# Patient Record
Sex: MALE | Race: WHITE | Employment: OTHER | URBAN - NONMETROPOLITAN AREA
[De-identification: names, ages, dates, MRNs, and addresses within clinical notes are randomized per-mention and may not be internally consistent; named-entity substitution may affect disease eponyms.]

---

## 2017-05-16 ENCOUNTER — OFFICE VISIT (OUTPATIENT)
Age: 62
End: 2017-05-16

## 2017-05-16 VITALS
OXYGEN SATURATION: 100 % | RESPIRATION RATE: 18 BRPM | HEIGHT: 72 IN | WEIGHT: 223.6 LBS | HEART RATE: 60 BPM | TEMPERATURE: 97.1 F | DIASTOLIC BLOOD PRESSURE: 70 MMHG | SYSTOLIC BLOOD PRESSURE: 120 MMHG | BODY MASS INDEX: 30.28 KG/M2

## 2017-05-16 DIAGNOSIS — R19.02 ABDOMINAL MASS, LUQ (LEFT UPPER QUADRANT): Primary | ICD-10-CM

## 2017-05-16 PROCEDURE — 3017F COLORECTAL CA SCREEN DOC REV: CPT | Performed by: SURGERY

## 2017-05-16 PROCEDURE — 99203 OFFICE O/P NEW LOW 30 MIN: CPT | Performed by: SURGERY

## 2017-05-16 PROCEDURE — G8427 DOCREV CUR MEDS BY ELIG CLIN: HCPCS | Performed by: SURGERY

## 2017-05-16 PROCEDURE — 1036F TOBACCO NON-USER: CPT | Performed by: SURGERY

## 2017-05-16 PROCEDURE — G8417 CALC BMI ABV UP PARAM F/U: HCPCS | Performed by: SURGERY

## 2017-05-16 ASSESSMENT — ENCOUNTER SYMPTOMS
STRIDOR: 0
CONSTIPATION: 0
ABDOMINAL DISTENTION: 0
APNEA: 0
NAUSEA: 0
BACK PAIN: 0
WHEEZING: 0
COLOR CHANGE: 0
SHORTNESS OF BREATH: 0
SINUS PRESSURE: 0
ABDOMINAL PAIN: 0
RECTAL PAIN: 0
EYE PAIN: 0
PHOTOPHOBIA: 0
TROUBLE SWALLOWING: 0
CHOKING: 0
DIARRHEA: 0
EYE ITCHING: 0
EYE DISCHARGE: 0
RHINORRHEA: 0
ANAL BLEEDING: 0
VOMITING: 0
FACIAL SWELLING: 0
BLOOD IN STOOL: 0
CHEST TIGHTNESS: 0
EYE REDNESS: 0
SORE THROAT: 0
VOICE CHANGE: 0
COUGH: 0

## 2017-05-30 ENCOUNTER — OFFICE VISIT (OUTPATIENT)
Age: 62
End: 2017-05-30

## 2017-05-30 VITALS
WEIGHT: 223.4 LBS | BODY MASS INDEX: 30.26 KG/M2 | HEIGHT: 72 IN | HEART RATE: 65 BPM | OXYGEN SATURATION: 96 % | TEMPERATURE: 97.4 F | RESPIRATION RATE: 16 BRPM | SYSTOLIC BLOOD PRESSURE: 118 MMHG | DIASTOLIC BLOOD PRESSURE: 74 MMHG

## 2017-05-30 DIAGNOSIS — R19.02 ABDOMINAL MASS, LUQ (LEFT UPPER QUADRANT): Primary | ICD-10-CM

## 2017-05-30 PROCEDURE — 99212 OFFICE O/P EST SF 10 MIN: CPT | Performed by: SURGERY

## 2017-05-30 PROCEDURE — G8417 CALC BMI ABV UP PARAM F/U: HCPCS | Performed by: SURGERY

## 2017-05-30 PROCEDURE — G8427 DOCREV CUR MEDS BY ELIG CLIN: HCPCS | Performed by: SURGERY

## 2017-05-30 PROCEDURE — 1036F TOBACCO NON-USER: CPT | Performed by: SURGERY

## 2017-05-30 PROCEDURE — 3017F COLORECTAL CA SCREEN DOC REV: CPT | Performed by: SURGERY

## 2018-09-29 ENCOUNTER — NURSE TRIAGE (OUTPATIENT)
Dept: ADMINISTRATIVE | Age: 63
End: 2018-09-29

## 2018-09-29 NOTE — TELEPHONE ENCOUNTER
Reason for Disposition   Triager unable to answer question    Answer Assessment - Initial Assessment Questions  1. ONSET: \"When did it happen? \" If happened < 10 minutes ago, ask: \"Did you wash off the chemical?\" If not, give First Aid Advice immediately. yesterday  2. CHEMICAL: \"What is the name of the substance? \"      Chlorine bleach   3. LOCATION: \"Where is the burn located? \"       Rt hand   4. BURN SIZE: \"How large is the burn? \"  The palm is roughly 1% of the total body surface area (BSA). Most of top and some on palm   5. SEVERITY OF THE BURN: \"Is there redness? \", \"Are there any blisters? \"      Redness  fluid  6. MECHANISM: \"Tell me how it happened. \"      Was cleaning   7. PAIN: Amelia Seats you having any pain? \" \"How bad is the pain? \" (Scale 1-10; or mild, moderate, severe)    - MILD (1-3): doesn't interfere with normal activities     - MODERATE (4-7): interferes with normal activities or awakens from sleep     - SEVERE (8-10): excruciating pain, unable to do any normal activities       irritated  8. OTHER SYMPTOMS: \"Do you have any other symptoms? \"      no  9. PREGNANCY: \"Is there any chance you are pregnant? \" \"When was your last menstrual period? \"      n/a    Protocols used: BURNS - CHEMICAL-ADULT-

## 2022-04-13 VITALS
HEART RATE: 55 BPM | WEIGHT: 229 LBS | BODY MASS INDEX: 31.06 KG/M2 | SYSTOLIC BLOOD PRESSURE: 137 MMHG | DIASTOLIC BLOOD PRESSURE: 67 MMHG

## 2022-04-13 PROBLEM — E78.5 HYPERLIPIDEMIA: Status: ACTIVE | Noted: 2022-04-13

## 2022-04-13 PROBLEM — G89.18 PAIN FOLLOWING SURGERY OR PROCEDURE: Status: ACTIVE | Noted: 2017-01-25

## 2022-04-13 PROBLEM — I10 BENIGN ESSENTIAL HTN: Status: ACTIVE | Noted: 2022-04-13

## 2022-04-13 PROBLEM — Z95.5 PRESENCE OF STENT IN CORONARY ARTERY: Status: ACTIVE | Noted: 2022-04-13

## 2022-04-13 PROBLEM — R91.1 LESION OF LUNG: Status: ACTIVE | Noted: 2017-01-25

## 2022-10-04 ENCOUNTER — OFFICE VISIT (OUTPATIENT)
Dept: CARDIOLOGY CLINIC | Age: 67
End: 2022-10-04
Payer: MEDICARE

## 2022-10-04 VITALS
SYSTOLIC BLOOD PRESSURE: 154 MMHG | BODY MASS INDEX: 31.89 KG/M2 | WEIGHT: 235.4 LBS | HEIGHT: 72 IN | HEART RATE: 59 BPM | DIASTOLIC BLOOD PRESSURE: 81 MMHG | RESPIRATION RATE: 18 BRPM

## 2022-10-04 DIAGNOSIS — I10 BENIGN ESSENTIAL HTN: Primary | ICD-10-CM

## 2022-10-04 DIAGNOSIS — I71.21 ANEURYSM OF ASCENDING AORTA WITHOUT RUPTURE: ICD-10-CM

## 2022-10-04 DIAGNOSIS — E78.5 DYSLIPIDEMIA: ICD-10-CM

## 2022-10-04 PROCEDURE — 93000 ELECTROCARDIOGRAM COMPLETE: CPT | Performed by: INTERNAL MEDICINE

## 2022-10-04 PROCEDURE — 1123F ACP DISCUSS/DSCN MKR DOCD: CPT | Performed by: INTERNAL MEDICINE

## 2022-10-04 PROCEDURE — 99213 OFFICE O/P EST LOW 20 MIN: CPT | Performed by: INTERNAL MEDICINE

## 2022-10-04 RX ORDER — ATORVASTATIN CALCIUM 40 MG/1
40 TABLET, FILM COATED ORAL DAILY
COMMUNITY
End: 2022-10-04 | Stop reason: SDUPTHER

## 2022-10-04 RX ORDER — NITROGLYCERIN 0.4 MG/1
0.4 TABLET SUBLINGUAL EVERY 5 MIN PRN
Qty: 25 TABLET | Refills: 6 | Status: SHIPPED | OUTPATIENT
Start: 2022-10-04

## 2022-10-04 RX ORDER — LOSARTAN POTASSIUM 25 MG/1
25 TABLET ORAL EVERY MORNING
Qty: 90 TABLET | Refills: 3 | Status: CANCELLED | OUTPATIENT
Start: 2022-10-04

## 2022-10-04 RX ORDER — LOSARTAN POTASSIUM 100 MG/1
100 TABLET ORAL DAILY
Qty: 30 TABLET | Refills: 5 | Status: SHIPPED | OUTPATIENT
Start: 2022-10-04

## 2022-10-04 RX ORDER — ATORVASTATIN CALCIUM 40 MG/1
40 TABLET, FILM COATED ORAL DAILY
Qty: 90 TABLET | Refills: 3 | Status: SHIPPED | OUTPATIENT
Start: 2022-10-04

## 2022-10-04 ASSESSMENT — ENCOUNTER SYMPTOMS
STRIDOR: 0
ABDOMINAL PAIN: 0
SHORTNESS OF BREATH: 0
VOMITING: 0
COLOR CHANGE: 0
ABDOMINAL DISTENTION: 0
TROUBLE SWALLOWING: 0
NAUSEA: 0
CHEST TIGHTNESS: 0
COUGH: 0
CHOKING: 0
ANAL BLEEDING: 0
VOICE CHANGE: 0
WHEEZING: 0
APNEA: 0
BLOOD IN STOOL: 0

## 2022-10-04 NOTE — PROGRESS NOTES
[address] [facility]  [address] [facility],[address]  Dept: [phone]  Loc: [phone]     10/4/2022       Sherri Graves is here today for   Chief Complaint   Patient presents with    Follow-up           Referring Physician:  No ref. provider found     Patient Active Problem List   Diagnosis    ST elevation myocardial infarction (STEMI) of inferior wall (HCC)    Presence of stent in coronary artery    Pain following surgery or procedure    Lesion of lung    Hyperlipidemia    Benign essential HTN       Review of Systems   Constitutional:  Negative for activity change, appetite change, fatigue, fever and unexpected weight change. HENT:  Negative for congestion, trouble swallowing and voice change. Eyes:  Negative for visual disturbance. Respiratory:  Negative for apnea, cough, choking, chest tightness, shortness of breath, wheezing and stridor. Cardiovascular:  Negative for chest pain, palpitations and leg swelling. Gastrointestinal:  Negative for abdominal distention, abdominal pain, anal bleeding, blood in stool, nausea and vomiting. Endocrine: Negative for cold intolerance and heat intolerance. Genitourinary:  Negative for hematuria. Musculoskeletal:  Negative for arthralgias, gait problem, joint swelling and myalgias. Skin:  Negative for color change and rash. Allergic/Immunologic: Negative for environmental allergies and food allergies. Neurological:  Negative for dizziness, tremors, syncope, facial asymmetry, weakness, light-headedness, numbness and headaches. Hematological:  Does not bruise/bleed easily. Psychiatric/Behavioral:  Negative for agitation, behavioral problems and sleep disturbance.        Past Medical History:   Diagnosis Date    CAD (coronary artery disease)     Hyperlipidemia     Hypertension        No Known Allergies    Current Outpatient Medications   Medication Sig Dispense Refill atorvastatin (LIPITOR) 40 MG tablet Take 1 tablet by mouth daily 90 tablet 3    metoprolol tartrate (LOPRESSOR) 25 MG tablet Take 1 tablet by mouth every evening 1/2 tablet twice daily 45 tablet 3    nitroGLYCERIN (NITROSTAT) 0.4 MG SL tablet Place 1 tablet under the tongue every 5 minutes as needed for Chest pain 25 tablet 6    losartan (COZAAR) 100 MG tablet Take 1 tablet by mouth daily 30 tablet 5    aspirin 325 MG tablet Take 1 tablet by mouth daily. 90 tablet 3    FISH OIL 1 capsule by Does not apply route daily. ALLOPURINOL PO Take 300 tablets by mouth daily. Indications: Gout      Coenzyme Q10 (COQ10 PO) Take 1 capsule by mouth daily. Indications: Decreased Mental Sharpness and Accuracy       No current facility-administered medications for this visit. Social History     Socioeconomic History    Marital status:      Spouse name: None    Number of children: None    Years of education: None    Highest education level: None   Tobacco Use    Smoking status: Never    Smokeless tobacco: Never   Substance and Sexual Activity    Alcohol use: No    Drug use: No    Sexual activity: Never       Family History   Problem Relation Age of Onset    Arthritis Mother     Kidney Disease Mother     Cancer Father     Hearing Loss Father     High Cholesterol Father     Vision Loss Father     Hearing Loss Sister     High Cholesterol Sister     Miscarriages / Stillbirths Sister     Vision Loss Sister     Hearing Loss Brother     Heart Disease Brother     High Cholesterol Brother     Vision Loss Brother     Clotting Disorder Brother     Diabetes Maternal Grandmother        Blood pressure (!) 154/81, pulse 59, resp. rate 18, height 6' (1.829 m), weight 235 lb 6.4 oz (106.8 kg).     Physical Exam:    General Appearance: alert and oriented to person, place and time, in no acute distress  Cardiovascular: normal rate, regular rhythm, normal S1 and S2, no murmurs, rubs, clicks, or gallops, distal pulses intact, no carotid bruits, no JVD  Pulmonary/Chest: clear to auscultation bilaterally- no wheezes, rales or rhonchi, normal air movement, no respiratory distress  Abdomen: soft, non-tender, non-distended, normal bowel sounds, no masses   Extremities: no cyanosis, clubbing or edema, pulse   Skin: warm and dry, no rash or erythema  Head: normocephalic and atraumatic  Eyes: pupils equal, round, and reactive to light  Neck: supple and non-tender without mass, no thyromegaly   Musculoskeletal: normal range of motion, no joint swelling, deformity or tenderness  Neurological: alert, oriented, normal speech, no focal findings or movement disorder noted    Lab Data:    Cardiac Enzymes:  No results for input(s): CKTOTAL, CKMB, CKMBINDEX, TROPONINI in the last 72 hours.     CBC:   Lab Results   Component Value Date/Time    WBC 7.6 04/19/2013 04:30 AM    RBC 3.91 04/19/2013 04:30 AM    HGB 12.9 04/19/2013 04:30 AM    HCT 36.7 04/19/2013 04:30 AM     04/19/2013 04:30 AM       CMP:    Lab Results   Component Value Date/Time     04/19/2013 04:30 AM    K 3.7 04/19/2013 04:30 AM     04/19/2013 04:30 AM    CO2 27 04/19/2013 04:30 AM    BUN 8 04/19/2013 04:30 AM    CREATININE 0.8 04/19/2013 04:30 AM    LABGLOM >90 04/19/2013 06:00 AM    GLUCOSE 107 04/19/2013 04:30 AM    CALCIUM 8.3 04/19/2013 04:30 AM       Hepatic Function Panel:    Lab Results   Component Value Date/Time    ALKPHOS 73 04/18/2013 12:12 PM    ALT 35 04/18/2013 12:12 PM    AST 54 04/18/2013 12:12 PM    PROT 5.8 04/18/2013 12:12 PM    BILITOT 0.6 04/18/2013 12:12 PM    BILIDIR 0.2 04/18/2013 12:12 PM    LABALBU 3.6 04/18/2013 12:12 PM       Magnesium:    Lab Results   Component Value Date/Time    MG 1.8 04/19/2013 04:30 AM       PT/INR:  No results found for: PROTIME, INR    HgBA1c:  No results found for: LABA1C    FLP:    Lab Results   Component Value Date/Time    TRIG 80 04/18/2013 12:12 PM    HDL 19 04/18/2013 12:12 PM    LDLCALC 42 04/18/2013 12:12 PM TSH:    Lab Results   Component Value Date/Time    TSH 1.446 07/19/2013 12:41 PM        Diagnosis Orders   1. Benign essential HTN  42281 - [place] ELECTROCARDIOGRAM, COMPLETE    CBC    Basic Metabolic Panel    Lipid Panel    Hepatic Function Panel      2. Aneurysm of ascending aorta without rupture  ECHO Complete 2D W Doppler W Color    CBC    Basic Metabolic Panel    Lipid Panel    Hepatic Function Panel      3. Dyslipidemia  CBC    Basic Metabolic Panel    Lipid Panel    Hepatic Function Panel           Assessment/Plan    Renu Escobar this is a patient 75years old gentleman with a history of coronary artery disease he has a history of prior intervention of the RCA many years ago. The patient did had an echocardiogram showed a preserved systolic function of the left ventricle and ascending aortic aneurysm at 4.1 cm mild mitral regurg mild AI preserved systolic function with medical patient is here for a follow-up. The lab findings and the echo findings were all discussed with him it was stressed to him the importance of getting his blood pressure under control his Cozaar increased to 100 mg. He is to check his blood pressure at home and let us know continue with the Toprol 50 mg heart rate is 59 he was educated about the ascending aortic aneurysm seek medical attention if he had any change in clinical condition he denied PND and orthopnea he was advised about diet exercise and weight reduction be seen in 3 months and in 1 year rather with an echocardiogram he is seek medical attention with any change in clinical condition all his questions were answered to his satisfaction his medication were reconciled and sent to his pharmacy and the patient to follow-up with family physician long-term care.     Orders Placed This Encounter   Procedures    CBC     Standing Status:   Future     Standing Expiration Date:   85/4/0916    Basic Metabolic Panel     Standing Status:   Future     Standing Expiration Date: 10/4/2023    Lipid Panel     Standing Status:   Future     Standing Expiration Date:   10/4/2023     Order Specific Question:   Is Patient Fasting?/# of Hours     Answer:   12 hours    Hepatic Function Panel     Standing Status:   Future     Standing Expiration Date:   10/4/2023    ECHO Complete 2D W Doppler W Color     Standing Status:   Future     Standing Expiration Date:   4/4/2024     Order Specific Question:   Reason for exam:     Answer:   aortic root size    90179 - [place] ELECTROCARDIOGRAM, COMPLETE       Return in about 1 year (around 10/4/2023) for cad.      Dax Heaton MD

## 2022-10-12 ENCOUNTER — HOSPITAL ENCOUNTER (OUTPATIENT)
Dept: NON INVASIVE DIAGNOSTICS | Age: 67
Discharge: HOME | End: 2022-10-12
Payer: MEDICARE

## 2022-10-12 DIAGNOSIS — I71.21 ANEURYSM OF ASCENDING AORTA WITHOUT RUPTURE: ICD-10-CM

## 2022-10-12 LAB
LV EF: 55 %
LVEF MODALITY: NORMAL

## 2022-10-12 PROCEDURE — 93306 TTE W/DOPPLER COMPLETE: CPT

## 2022-12-22 NOTE — TELEPHONE ENCOUNTER
lOSARTAN 100MG    ATORVASTATIN 40MG    METOPROLOL TART 25MG    NTROGLYCERIN 0.4    NEXT APPT.  10/04/2023

## 2022-12-27 RX ORDER — LOSARTAN POTASSIUM 100 MG/1
100 TABLET ORAL DAILY
Qty: 90 TABLET | Refills: 3 | Status: SHIPPED | OUTPATIENT
Start: 2022-12-27

## 2022-12-27 RX ORDER — NITROGLYCERIN 0.4 MG/1
0.4 TABLET SUBLINGUAL EVERY 5 MIN PRN
Qty: 25 TABLET | Refills: 3 | Status: SHIPPED | OUTPATIENT
Start: 2022-12-27

## 2022-12-27 RX ORDER — ATORVASTATIN CALCIUM 40 MG/1
40 TABLET, FILM COATED ORAL DAILY
Qty: 90 TABLET | Refills: 3 | Status: SHIPPED | OUTPATIENT
Start: 2022-12-27

## 2023-11-16 ENCOUNTER — OFFICE VISIT (OUTPATIENT)
Dept: CARDIOLOGY CLINIC | Age: 68
End: 2023-11-16
Payer: MEDICARE

## 2023-11-16 VITALS
HEART RATE: 62 BPM | WEIGHT: 224 LBS | RESPIRATION RATE: 18 BRPM | SYSTOLIC BLOOD PRESSURE: 139 MMHG | BODY MASS INDEX: 30.34 KG/M2 | DIASTOLIC BLOOD PRESSURE: 84 MMHG | HEIGHT: 72 IN

## 2023-11-16 DIAGNOSIS — I71.21 ANEURYSM OF ASCENDING AORTA WITHOUT RUPTURE (HCC): ICD-10-CM

## 2023-11-16 DIAGNOSIS — E78.5 HYPERLIPIDEMIA, UNSPECIFIED HYPERLIPIDEMIA TYPE: Primary | ICD-10-CM

## 2023-11-16 DIAGNOSIS — E78.5 DYSLIPIDEMIA (HIGH LDL; LOW HDL): ICD-10-CM

## 2023-11-16 PROCEDURE — 1123F ACP DISCUSS/DSCN MKR DOCD: CPT | Performed by: INTERNAL MEDICINE

## 2023-11-16 PROCEDURE — 3075F SYST BP GE 130 - 139MM HG: CPT | Performed by: INTERNAL MEDICINE

## 2023-11-16 PROCEDURE — 99214 OFFICE O/P EST MOD 30 MIN: CPT | Performed by: INTERNAL MEDICINE

## 2023-11-16 PROCEDURE — 93000 ELECTROCARDIOGRAM COMPLETE: CPT | Performed by: INTERNAL MEDICINE

## 2023-11-16 PROCEDURE — 3079F DIAST BP 80-89 MM HG: CPT | Performed by: INTERNAL MEDICINE

## 2023-11-16 RX ORDER — LOSARTAN POTASSIUM 100 MG/1
100 TABLET ORAL DAILY
Qty: 90 TABLET | Refills: 3 | Status: SHIPPED | OUTPATIENT
Start: 2023-11-16

## 2023-11-16 RX ORDER — NITROGLYCERIN 0.4 MG/1
0.4 TABLET SUBLINGUAL EVERY 5 MIN PRN
Qty: 25 TABLET | Refills: 3 | Status: SHIPPED | OUTPATIENT
Start: 2023-11-16

## 2023-11-16 RX ORDER — ATORVASTATIN CALCIUM 40 MG/1
40 TABLET, FILM COATED ORAL DAILY
Qty: 90 TABLET | Refills: 3 | Status: SHIPPED | OUTPATIENT
Start: 2023-11-16

## 2023-11-16 ASSESSMENT — ENCOUNTER SYMPTOMS
VOMITING: 0
ABDOMINAL DISTENTION: 0
BLOOD IN STOOL: 0
STRIDOR: 0
ABDOMINAL PAIN: 0
COUGH: 0
SHORTNESS OF BREATH: 0
CHOKING: 0
CHEST TIGHTNESS: 0
TROUBLE SWALLOWING: 0
NAUSEA: 0
COLOR CHANGE: 0
APNEA: 0
WHEEZING: 0
ANAL BLEEDING: 0
VOICE CHANGE: 0

## 2023-11-16 NOTE — PROGRESS NOTES
Lab Results   Component Value Date/Time    TSH 1.446 07/19/2013 12:41 PM        Diagnosis Orders   1. Hyperlipidemia, unspecified hyperlipidemia type  45917 - [place] ELECTROCARDIOGRAM, COMPLETE    Echo (TTE) complete (PRN contrast/bubble/strain/3D)    CBC    Basic Metabolic Panel    Lipid Panel    Hepatic Function Panel      2. Aneurysm of ascending aorta without rupture (HCC)  Echo (TTE) complete (PRN contrast/bubble/strain/3D)    CBC    Basic Metabolic Panel    Lipid Panel    Hepatic Function Panel      3. Dyslipidemia (high LDL; low HDL)  CBC    Basic Metabolic Panel    Lipid Panel    Hepatic Function Panel           Assessment/Plan    Yu Fontan is 70years old patient history of atherosclerotic coronary artery disease this patient has a history of inferior myocardial infarction about 10 years ago at the time he had total occlusion of the RCA with stenting of the RCA has moderate disease of the left coronary system he has been treated medically since then and he has no chest pain no shortness of breath he has been physically active. The patient has a bleeding of his blood pressure at home mostly in the 110 range. The patient had an echo showed mild dilatation of the ascending aorta and echocardiogram will be obtained to monitor the ascending aortic aneurysm. He had mild AI mild mitral regurg and this need to be monitored. He is somewhat overweight. .    His lab discussed with him his EKG findings discussed with him has a chronic sinus rhythm with atrial bigeminy.     Medication were reconciled and sent to his pharmacy and patient will be seen periodically end of dictation thank    Orders Placed This Encounter   Procedures    CBC     Standing Status:   Future     Standing Expiration Date:   13/64/1974    Basic Metabolic Panel     Standing Status:   Future     Standing Expiration Date:   11/16/2024    Lipid Panel     Standing Status:   Future     Standing Expiration Date:   11/16/2024     Order Specific Question:   Is

## 2023-11-24 ENCOUNTER — HOSPITAL ENCOUNTER (OUTPATIENT)
Age: 68
End: 2023-11-24
Attending: INTERNAL MEDICINE
Payer: MEDICARE

## 2023-11-24 VITALS
SYSTOLIC BLOOD PRESSURE: 139 MMHG | DIASTOLIC BLOOD PRESSURE: 84 MMHG | WEIGHT: 224 LBS | HEIGHT: 72 IN | BODY MASS INDEX: 30.34 KG/M2

## 2023-11-24 DIAGNOSIS — I71.21 ANEURYSM OF ASCENDING AORTA WITHOUT RUPTURE (HCC): ICD-10-CM

## 2023-11-24 DIAGNOSIS — E78.5 HYPERLIPIDEMIA, UNSPECIFIED HYPERLIPIDEMIA TYPE: ICD-10-CM

## 2023-11-24 LAB
ECHO AO ASC DIAM: 3.9 CM
ECHO AO ASCENDING AORTA INDEX: 1.74 CM/M2
ECHO AR MAX VEL PISA: 3.5 M/S
ECHO AV CUSP MM: 2.3 CM
ECHO AV PEAK GRADIENT: 7 MMHG
ECHO AV PEAK VELOCITY: 1.3 M/S
ECHO AV REGURGITANT PHT: 741 MS
ECHO AV VELOCITY RATIO: 0.77
ECHO BSA: 2.27 M2
ECHO LA AREA 2C: 12.5 CM2
ECHO LA AREA 4C: 11.7 CM2
ECHO LA DIAMETER INDEX: 1.65 CM/M2
ECHO LA DIAMETER: 3.7 CM
ECHO LA MAJOR AXIS: 4 CM
ECHO LA MINOR AXIS: 4.7 CM
ECHO LA VOL BP: 29 ML (ref 18–58)
ECHO LA VOL MOD A2C: 26 ML (ref 18–58)
ECHO LA VOL MOD A4C: 28 ML (ref 18–58)
ECHO LA VOL/BSA BIPLANE: 13 ML/M2 (ref 16–34)
ECHO LA VOLUME INDEX MOD A2C: 12 ML/M2 (ref 16–34)
ECHO LA VOLUME INDEX MOD A4C: 13 ML/M2 (ref 16–34)
ECHO LV E' LATERAL VELOCITY: 8 CM/S
ECHO LV E' SEPTAL VELOCITY: 7 CM/S
ECHO LV FRACTIONAL SHORTENING: 29 % (ref 28–44)
ECHO LV INTERNAL DIMENSION DIASTOLE INDEX: 2.19 CM/M2
ECHO LV INTERNAL DIMENSION DIASTOLIC: 4.9 CM (ref 4.2–5.9)
ECHO LV INTERNAL DIMENSION SYSTOLIC INDEX: 1.56 CM/M2
ECHO LV INTERNAL DIMENSION SYSTOLIC: 3.5 CM
ECHO LV IVSD: 0.9 CM (ref 0.6–1)
ECHO LV MASS 2D: 153 G (ref 88–224)
ECHO LV MASS INDEX 2D: 68.3 G/M2 (ref 49–115)
ECHO LV POSTERIOR WALL DIASTOLIC: 0.9 CM (ref 0.6–1)
ECHO LV RELATIVE WALL THICKNESS RATIO: 0.37
ECHO LVOT PEAK GRADIENT: 4 MMHG
ECHO LVOT PEAK VELOCITY: 1 M/S
ECHO MV A VELOCITY: 0.87 M/S
ECHO MV E DECELERATION TIME (DT): 285 MS
ECHO MV E VELOCITY: 0.97 M/S
ECHO MV E/A RATIO: 1.11
ECHO MV E/E' LATERAL: 12.13
ECHO MV E/E' RATIO (AVERAGED): 12.99
ECHO PV MAX VELOCITY: 0.6 M/S
ECHO PV PEAK GRADIENT: 1 MMHG
ECHO RV INTERNAL DIMENSION: 2.5 CM
ECHO RV TAPSE: 2 CM (ref 1.7–?)
ECHO TV E WAVE: 0.7 M/S
ECHO TV REGURGITANT MAX VELOCITY: 2.64 M/S
ECHO TV REGURGITANT PEAK GRADIENT: 28 MMHG

## 2023-11-24 PROCEDURE — 93306 TTE W/DOPPLER COMPLETE: CPT

## 2023-11-24 PROCEDURE — 93306 TTE W/DOPPLER COMPLETE: CPT | Performed by: INTERNAL MEDICINE

## 2024-01-09 RX ORDER — LOSARTAN POTASSIUM 100 MG/1
100 TABLET ORAL DAILY
Qty: 90 TABLET | Refills: 3 | Status: SHIPPED | OUTPATIENT
Start: 2024-01-09

## 2024-11-05 ENCOUNTER — OFFICE VISIT (OUTPATIENT)
Dept: CARDIOLOGY CLINIC | Age: 69
End: 2024-11-05

## 2024-11-05 VITALS
WEIGHT: 244.4 LBS | HEART RATE: 58 BPM | BODY MASS INDEX: 33.1 KG/M2 | DIASTOLIC BLOOD PRESSURE: 76 MMHG | SYSTOLIC BLOOD PRESSURE: 137 MMHG | HEIGHT: 72 IN

## 2024-11-05 DIAGNOSIS — E78.01 FAMILIAL HYPERCHOLESTEROLEMIA: ICD-10-CM

## 2024-11-05 DIAGNOSIS — I10 PRIMARY HYPERTENSION: ICD-10-CM

## 2024-11-05 DIAGNOSIS — I25.10 CORONARY ARTERY DISEASE INVOLVING NATIVE CORONARY ARTERY OF NATIVE HEART WITHOUT ANGINA PECTORIS: Primary | ICD-10-CM

## 2024-11-05 RX ORDER — ATORVASTATIN CALCIUM 40 MG/1
40 TABLET, FILM COATED ORAL DAILY
Qty: 90 TABLET | Refills: 3 | Status: SHIPPED | OUTPATIENT
Start: 2024-11-05

## 2024-11-05 RX ORDER — METOPROLOL TARTRATE 25 MG/1
12.5 TABLET, FILM COATED ORAL 2 TIMES DAILY
Qty: 90 TABLET | Refills: 3 | Status: SHIPPED | OUTPATIENT
Start: 2024-11-05

## 2024-11-05 RX ORDER — LOSARTAN POTASSIUM 100 MG/1
100 TABLET ORAL DAILY
Qty: 90 TABLET | Refills: 3 | Status: SHIPPED | OUTPATIENT
Start: 2024-11-05

## 2024-11-05 ASSESSMENT — ENCOUNTER SYMPTOMS
CHEST TIGHTNESS: 0
RECTAL PAIN: 0
EYE REDNESS: 0
COLOR CHANGE: 0
NAUSEA: 0
ANAL BLEEDING: 0
BACK PAIN: 0
SHORTNESS OF BREATH: 0
DIARRHEA: 0
ABDOMINAL PAIN: 0
ABDOMINAL DISTENTION: 0
VOMITING: 0
BLOOD IN STOOL: 0
CONSTIPATION: 0

## 2024-11-05 NOTE — PROGRESS NOTES
[facility]  [facility]  [address] [place] [address]  Dept: [phone]  Dept Fax: [phone]  Loc: [phone]    Visit Date: 11/5/2024    Akira Noriega is a 69 y.o. male who presents todayfor:  Chief Complaint   Patient presents with    Hypertension    Hyperlipidemia    Coronary Artery Disease   Here for the first time  Used to see Adriano   MI and RCA stent 2013  Medical Rx since then   Last EF 60%  TAA 4.1 cm 2  No chest pain   No changes in breathing  Active  No use of nitro   Dos have HTN and hyperlipidemia  On medical RX   Seems stable   No dizziness  No syncope  No smoking   Family history of CAD  No smoking  Family history of CAD       HPI:  Hypertension  Pertinent negatives include no chest pain, neck pain, palpitations or shortness of breath.   Hyperlipidemia  Pertinent negatives include no chest pain, myalgias or shortness of breath.   Coronary Artery Disease  Pertinent negatives include no chest pain, chest tightness, dizziness, leg swelling, palpitations or shortness of breath. Risk factors include hyperlipidemia.     Past Medical History:   Diagnosis Date    CAD (coronary artery disease)     Hyperlipidemia     Hypertension       Past Surgical History:   Procedure Laterality Date    LUNG SURGERY  01/2017    wedge resection lower left lobe -Dr Emma ESCALERA     TONSILLECTOMY       Family History   Problem Relation Age of Onset    Arthritis Mother     Kidney Disease Mother     Cancer Father     Hearing Loss Father     High Cholesterol Father     Vision Loss Father     Hearing Loss Sister     High Cholesterol Sister     Miscarriages / Stillbirths Sister     Vision Loss Sister     Hearing Loss Brother     Heart Disease Brother     High Cholesterol Brother     Vision Loss Brother     Clotting Disorder Brother     Diabetes Maternal Grandmother      Social History     Tobacco Use    Smoking status: Never    Smokeless tobacco: Never

## 2025-02-24 RX ORDER — METOPROLOL TARTRATE 25 MG/1
12.5 TABLET, FILM COATED ORAL 2 TIMES DAILY
Qty: 90 TABLET | Refills: 2 | Status: SHIPPED | OUTPATIENT
Start: 2025-02-24